# Patient Record
Sex: FEMALE | URBAN - METROPOLITAN AREA
[De-identification: names, ages, dates, MRNs, and addresses within clinical notes are randomized per-mention and may not be internally consistent; named-entity substitution may affect disease eponyms.]

---

## 2021-08-31 ENCOUNTER — APPOINTMENT (RX ONLY)
Dept: URBAN - METROPOLITAN AREA CLINIC 52 | Facility: CLINIC | Age: 76
Setting detail: DERMATOLOGY
End: 2021-08-31

## 2021-08-31 DIAGNOSIS — L30.4 ERYTHEMA INTERTRIGO: ICD-10-CM | Status: INADEQUATELY CONTROLLED

## 2021-08-31 DIAGNOSIS — L21.8 OTHER SEBORRHEIC DERMATITIS: ICD-10-CM

## 2021-08-31 PROCEDURE — ? COUNSELING

## 2021-08-31 PROCEDURE — ? PRESCRIPTION

## 2021-08-31 PROCEDURE — 99204 OFFICE O/P NEW MOD 45 MIN: CPT

## 2021-08-31 RX ORDER — HYDROCORTISONE 25 MG/G
CREAM TOPICAL BID
Qty: 453.6 | Refills: 3 | Status: ERX | COMMUNITY
Start: 2021-08-31

## 2021-08-31 RX ORDER — KETOCONAZOLE 20 MG/G
CREAM TOPICAL
Qty: 1 | Refills: 3 | Status: ERX | COMMUNITY
Start: 2021-08-31

## 2021-08-31 RX ORDER — FLUCONAZOLE 150 MG/1
TABLET ORAL
Qty: 2 | Refills: 0 | Status: ERX | COMMUNITY
Start: 2021-08-31

## 2021-08-31 RX ORDER — KETOCONAZOLE 20 MG/ML
SHAMPOO, SUSPENSION TOPICAL
Qty: 120 | Refills: 3 | Status: ERX | COMMUNITY
Start: 2021-08-31

## 2021-08-31 RX ADMIN — KETOCONAZOLE: 20 SHAMPOO, SUSPENSION TOPICAL at 00:00

## 2021-08-31 RX ADMIN — KETOCONAZOLE: 20 CREAM TOPICAL at 00:00

## 2021-08-31 RX ADMIN — HYDROCORTISONE: 25 CREAM TOPICAL at 00:00

## 2021-08-31 RX ADMIN — FLUCONAZOLE: 150 TABLET ORAL at 00:00

## 2021-09-14 ENCOUNTER — APPOINTMENT (RX ONLY)
Dept: URBAN - METROPOLITAN AREA CLINIC 52 | Facility: CLINIC | Age: 76
Setting detail: DERMATOLOGY
End: 2021-09-14

## 2021-09-14 DIAGNOSIS — L21.8 OTHER SEBORRHEIC DERMATITIS: ICD-10-CM

## 2021-09-14 DIAGNOSIS — B35.4 TINEA CORPORIS: ICD-10-CM

## 2021-09-14 PROCEDURE — ? PRESCRIPTION

## 2021-09-14 PROCEDURE — ? COUNSELING

## 2021-09-14 PROCEDURE — 99213 OFFICE O/P EST LOW 20 MIN: CPT

## 2021-09-14 PROCEDURE — ? TREATMENT REGIMEN

## 2021-09-14 RX ORDER — NYSTATIN 100000 [USP'U]/G
POWDER TOPICAL BID
Qty: 30 | Refills: 2 | Status: ERX | COMMUNITY
Start: 2021-09-14

## 2021-09-14 RX ORDER — FLUCONAZOLE 150 MG/1
TABLET ORAL QWEEK
Qty: 4 | Refills: 0 | Status: ERX

## 2021-09-14 RX ADMIN — NYSTATIN: 100000 POWDER TOPICAL at 00:00

## 2021-09-14 NOTE — PROCEDURE: TREATMENT REGIMEN
Detail Level: Zone
Continue Regimen: Diflucan 150 mg tablet\\n\\nKetoconazole 2% cream\\n\\nHydrocortisone 2.5% cream
Initiate Treatment: nystatin 100,000 unit/gram topical powder
Continue Regimen: Ketoconazole 2% shampoo

## 2021-10-04 ENCOUNTER — APPOINTMENT (RX ONLY)
Dept: URBAN - METROPOLITAN AREA CLINIC 52 | Facility: CLINIC | Age: 76
Setting detail: DERMATOLOGY
End: 2021-10-04

## 2021-10-04 DIAGNOSIS — B35.4 TINEA CORPORIS: ICD-10-CM

## 2021-10-04 PROCEDURE — 99213 OFFICE O/P EST LOW 20 MIN: CPT

## 2021-10-04 PROCEDURE — ? COUNSELING

## 2021-10-04 PROCEDURE — ? PRESCRIPTION

## 2021-10-04 PROCEDURE — ? TREATMENT REGIMEN

## 2021-10-04 RX ORDER — DESONIDE 0.5 MG/G
CREAM TOPICAL
Qty: 60 | Refills: 1 | Status: ERX | COMMUNITY
Start: 2021-10-04

## 2021-10-04 RX ORDER — KETOCONAZOLE 20 MG/G
CREAM TOPICAL BID
Qty: 60 | Refills: 1 | Status: ERX

## 2021-10-04 RX ADMIN — DESONIDE: 0.5 CREAM TOPICAL at 00:00

## 2021-10-04 ASSESSMENT — LOCATION DETAILED DESCRIPTION DERM
LOCATION DETAILED: RIGHT RIB CAGE
LOCATION DETAILED: RIGHT SUPRAPUBIC SKIN
LOCATION DETAILED: LEFT RIB CAGE

## 2021-10-04 ASSESSMENT — LOCATION SIMPLE DESCRIPTION DERM
LOCATION SIMPLE: ABDOMEN
LOCATION SIMPLE: GROIN

## 2021-10-04 ASSESSMENT — LOCATION ZONE DERM: LOCATION ZONE: TRUNK

## 2021-11-03 ENCOUNTER — APPOINTMENT (RX ONLY)
Dept: URBAN - METROPOLITAN AREA CLINIC 52 | Facility: CLINIC | Age: 76
Setting detail: DERMATOLOGY
End: 2021-11-03

## 2021-11-03 DIAGNOSIS — L73.2 HIDRADENITIS SUPPURATIVA: ICD-10-CM | Status: INADEQUATELY CONTROLLED

## 2021-11-03 DIAGNOSIS — B35.4 TINEA CORPORIS: ICD-10-CM

## 2021-11-03 PROCEDURE — ? TREATMENT REGIMEN

## 2021-11-03 PROCEDURE — 99214 OFFICE O/P EST MOD 30 MIN: CPT

## 2021-11-03 PROCEDURE — ? PRESCRIPTION

## 2021-11-03 PROCEDURE — ? COUNSELING

## 2021-11-03 RX ORDER — CLINDAMYCIN PHOSPHATE 10 MG/ML
SOLUTION TOPICAL
Qty: 60 | Refills: 1 | Status: ERX | COMMUNITY
Start: 2021-11-03

## 2021-11-03 RX ORDER — KETOCONAZOLE 20 MG/G
CREAM TOPICAL BID
Qty: 60 | Refills: 1 | Status: ERX

## 2021-11-03 RX ORDER — MINOCYCLINE HYDROCHLORIDE 100 MG/1
TABLET ORAL
Qty: 60 | Refills: 0 | Status: ERX | COMMUNITY
Start: 2021-11-03

## 2021-11-03 RX ORDER — DESONIDE 0.5 MG/G
CREAM TOPICAL
Qty: 60 | Refills: 1 | Status: ERX

## 2021-11-03 RX ADMIN — MINOCYCLINE HYDROCHLORIDE: 100 TABLET ORAL at 00:00

## 2021-11-03 RX ADMIN — CLINDAMYCIN PHOSPHATE: 10 SOLUTION TOPICAL at 00:00

## 2021-11-03 ASSESSMENT — LOCATION DETAILED DESCRIPTION DERM
LOCATION DETAILED: RIGHT SUPRAPUBIC SKIN
LOCATION DETAILED: RIGHT LATERAL ABDOMEN
LOCATION DETAILED: LEFT SUPRAPUBIC SKIN
LOCATION DETAILED: RIGHT RIB CAGE
LOCATION DETAILED: PERIUMBILICAL SKIN
LOCATION DETAILED: RIGHT SUPRAPUBIC SKIN
LOCATION DETAILED: LEFT RIB CAGE

## 2021-11-03 ASSESSMENT — LOCATION SIMPLE DESCRIPTION DERM
LOCATION SIMPLE: GROIN
LOCATION SIMPLE: ABDOMEN
LOCATION SIMPLE: ABDOMEN
LOCATION SIMPLE: GROIN

## 2021-11-03 ASSESSMENT — LOCATION ZONE DERM
LOCATION ZONE: TRUNK
LOCATION ZONE: TRUNK

## 2021-11-03 NOTE — PROCEDURE: TREATMENT REGIMEN
Continue Regimen: Ketoconazole 2% cream\\n\\nDesonide 0.05% cream
Detail Level: Zone
Initiate Treatment: clindamycin phosphate 1 % topical solution \\n\\nminocycline 100 mg tablet

## 2022-02-07 ENCOUNTER — APPOINTMENT (RX ONLY)
Dept: URBAN - METROPOLITAN AREA CLINIC 52 | Facility: CLINIC | Age: 77
Setting detail: DERMATOLOGY
End: 2022-02-07

## 2022-02-07 DIAGNOSIS — L73.2 HIDRADENITIS SUPPURATIVA: ICD-10-CM

## 2022-02-07 DIAGNOSIS — B35.4 TINEA CORPORIS: ICD-10-CM

## 2022-02-07 PROCEDURE — ? COUNSELING

## 2022-02-07 PROCEDURE — ? TREATMENT REGIMEN

## 2022-02-07 PROCEDURE — ? PRESCRIPTION

## 2022-02-07 PROCEDURE — 99214 OFFICE O/P EST MOD 30 MIN: CPT

## 2022-02-07 RX ORDER — NYSTATIN 100000 [USP'U]/G
POWDER TOPICAL
Qty: 60 | Refills: 3 | Status: ERX

## 2022-02-07 RX ORDER — CLINDAMYCIN PHOSPHATE 10 MG/ML
SOLUTION TOPICAL
Qty: 60 | Refills: 3 | Status: ERX

## 2022-02-07 RX ORDER — HYDROCORTISONE 25 MG/G
CREAM TOPICAL
Qty: 453.6 | Refills: 3 | Status: ERX

## 2022-02-07 ASSESSMENT — LOCATION DETAILED DESCRIPTION DERM
LOCATION DETAILED: LEFT SUPRAPUBIC SKIN
LOCATION DETAILED: LEFT MEDIAL BREAST 7-8:00 REGION
LOCATION DETAILED: RIGHT MEDIAL BREAST 5-6:00 REGION
LOCATION DETAILED: RIGHT LATERAL BREAST 6-7:00 REGION
LOCATION DETAILED: LEFT INFRAMAMMARY CREASE (INNER QUADRANT)
LOCATION DETAILED: PERIUMBILICAL SKIN
LOCATION DETAILED: RIGHT MEDIAL BREAST 4-5:00 REGION

## 2022-02-07 ASSESSMENT — LOCATION SIMPLE DESCRIPTION DERM
LOCATION SIMPLE: ABDOMEN
LOCATION SIMPLE: RIGHT BREAST
LOCATION SIMPLE: LEFT BREAST
LOCATION SIMPLE: GROIN

## 2022-02-07 ASSESSMENT — LOCATION ZONE DERM: LOCATION ZONE: TRUNK

## 2022-02-07 NOTE — PROCEDURE: TREATMENT REGIMEN
Initiate Treatment: clindamycin phosphate 1 % topical solution
Detail Level: Zone
Initiate Treatment: nystatin 100,000 unit/gram topical powder \\n\\nhydrocortisone 2.5 % topical cream

## 2023-07-17 ENCOUNTER — APPOINTMENT (RX ONLY)
Dept: URBAN - METROPOLITAN AREA CLINIC 52 | Facility: CLINIC | Age: 78
Setting detail: DERMATOLOGY
End: 2023-07-17

## 2023-07-17 DIAGNOSIS — L21.8 OTHER SEBORRHEIC DERMATITIS: ICD-10-CM | Status: INADEQUATELY CONTROLLED

## 2023-07-17 PROCEDURE — 99214 OFFICE O/P EST MOD 30 MIN: CPT

## 2023-07-17 PROCEDURE — ? PRESCRIPTION

## 2023-07-17 PROCEDURE — ? COUNSELING

## 2023-07-17 RX ORDER — CLOBETASOL PROPIONATE 0.5 MG/ML
SOLUTION TOPICAL
Qty: 50 | Refills: 3 | Status: CANCELLED

## 2023-07-17 RX ORDER — BETAMETHASONE VALERATE 1.2 MG/G
AEROSOL, FOAM TOPICAL
Qty: 100 | Refills: 2 | Status: ERX | COMMUNITY
Start: 2023-07-17

## 2023-07-17 RX ORDER — KETOCONAZOLE 20 MG/ML
SHAMPOO, SUSPENSION TOPICAL
Qty: 120 | Refills: 2 | Status: ERX

## 2023-07-17 RX ADMIN — BETAMETHASONE VALERATE: 1.2 AEROSOL, FOAM TOPICAL at 00:00

## 2023-07-17 ASSESSMENT — LOCATION ZONE DERM: LOCATION ZONE: SCALP

## 2023-07-17 ASSESSMENT — LOCATION DETAILED DESCRIPTION DERM: LOCATION DETAILED: HAIR

## 2023-07-17 ASSESSMENT — LOCATION SIMPLE DESCRIPTION DERM: LOCATION SIMPLE: HAIR

## 2023-08-07 RX ORDER — KETOCONAZOLE 20 MG/ML
SHAMPOO, SUSPENSION TOPICAL
Qty: 120 | Refills: 1 | Status: ERX

## 2023-08-07 RX ORDER — BETAMETHASONE VALERATE 1.2 MG/G
AEROSOL, FOAM TOPICAL
Qty: 100 | Refills: 1 | Status: ERX

## 2025-03-11 ENCOUNTER — APPOINTMENT (OUTPATIENT)
Dept: URBAN - METROPOLITAN AREA CLINIC 52 | Facility: CLINIC | Age: 80
Setting detail: DERMATOLOGY
End: 2025-03-11

## 2025-03-11 DIAGNOSIS — L40.8 OTHER PSORIASIS: ICD-10-CM | Status: WORSENING

## 2025-03-11 PROCEDURE — 99214 OFFICE O/P EST MOD 30 MIN: CPT

## 2025-03-11 PROCEDURE — ? COUNSELING

## 2025-03-11 PROCEDURE — ? PRESCRIPTION

## 2025-03-11 RX ORDER — DESONIDE 0.5 MG/G
CREAM TOPICAL BID
Qty: 60 | Refills: 3 | Status: ERX

## 2025-03-11 RX ORDER — TACROLIMUS 1 MG/G
OINTMENT TOPICAL
Qty: 60 | Refills: 2 | Status: ERX | COMMUNITY
Start: 2025-03-11

## 2025-03-11 RX ORDER — ROFLUMILAST 3 MG/G
AEROSOL, FOAM TOPICAL
Qty: 60 | Refills: 2 | Status: ERX | COMMUNITY
Start: 2025-03-11

## 2025-03-11 RX ADMIN — ROFLUMILAST: 3 AEROSOL, FOAM TOPICAL at 00:00

## 2025-03-11 RX ADMIN — TACROLIMUS: 1 OINTMENT TOPICAL at 00:00

## 2025-03-11 ASSESSMENT — LOCATION SIMPLE DESCRIPTION DERM: LOCATION SIMPLE: SCALP

## 2025-03-11 ASSESSMENT — LOCATION ZONE DERM: LOCATION ZONE: SCALP

## 2025-03-11 ASSESSMENT — LOCATION DETAILED DESCRIPTION DERM
LOCATION DETAILED: RIGHT CENTRAL POSTAURICULAR SKIN
LOCATION DETAILED: LEFT INFERIOR POSTAURICULAR SKIN